# Patient Record
Sex: MALE | Race: OTHER | Employment: FULL TIME | URBAN - NONMETROPOLITAN AREA
[De-identification: names, ages, dates, MRNs, and addresses within clinical notes are randomized per-mention and may not be internally consistent; named-entity substitution may affect disease eponyms.]

---

## 2017-10-10 ENCOUNTER — HOSPITAL ENCOUNTER (EMERGENCY)
Age: 37
Discharge: HOME | End: 2017-10-10
Payer: COMMERCIAL

## 2017-10-10 VITALS
WEIGHT: 217 LBS | TEMPERATURE: 98.1 F | SYSTOLIC BLOOD PRESSURE: 129 MMHG | RESPIRATION RATE: 16 BRPM | HEART RATE: 84 BPM | DIASTOLIC BLOOD PRESSURE: 91 MMHG | OXYGEN SATURATION: 97 %

## 2017-10-10 DIAGNOSIS — R19.7 DIARRHEA, UNSPECIFIED TYPE: Primary | ICD-10-CM

## 2017-10-10 PROCEDURE — 99202 OFFICE O/P NEW SF 15 MIN: CPT | Performed by: NURSE PRACTITIONER

## 2017-10-10 PROCEDURE — 99203 OFFICE O/P NEW LOW 30 MIN: CPT

## 2017-10-10 ASSESSMENT — PAIN DESCRIPTION - ORIENTATION: ORIENTATION: MID

## 2017-10-10 ASSESSMENT — ENCOUNTER SYMPTOMS
ABDOMINAL PAIN: 1
CHEST TIGHTNESS: 0
NAUSEA: 0
SHORTNESS OF BREATH: 0
VOMITING: 0
COUGH: 0
BLOOD IN STOOL: 0
DIARRHEA: 1

## 2017-10-10 ASSESSMENT — PAIN SCALES - GENERAL: PAINLEVEL_OUTOF10: 2

## 2017-10-10 ASSESSMENT — PAIN DESCRIPTION - DESCRIPTORS: DESCRIPTORS: ACHING;PRESSURE

## 2017-10-10 ASSESSMENT — PAIN DESCRIPTION - PAIN TYPE: TYPE: ACUTE PAIN

## 2017-10-10 ASSESSMENT — PAIN DESCRIPTION - LOCATION: LOCATION: ABDOMEN

## 2017-10-10 NOTE — ED NOTES
Pt verbalized discharge instructions. Pt informed to go to ER if develop chest pain, shortness of breath or abdominal pain. Pt ambulatory out in stable condition. Assessment unchanged.        Kelly Gauthier RN  10/10/17 9986

## 2017-10-10 NOTE — ED PROVIDER NOTES
He reports that he does not use drugs. PHYSICAL EXAM     ED TRIAGE VITALS  BP: (!) 129/91, Temp: 98.1 °F (36.7 °C), Pulse: 84, Resp: 16, SpO2: 97 %  Physical Exam   Constitutional: He is oriented to person, place, and time. Vital signs are normal. He appears well-developed and well-nourished. He is cooperative. Non-toxic appearance. He does not have a sickly appearance. He does not appear ill. No distress. HENT:   Head: Normocephalic and atraumatic. Right Ear: External ear normal.   Left Ear: External ear normal.   Nose: Nose normal.   Mouth/Throat: Mucous membranes are normal.   Eyes: Conjunctivae are normal.   Neck: Normal range of motion and full passive range of motion without pain. Cardiovascular: Normal rate. Pulmonary/Chest: Effort normal. No accessory muscle usage. No respiratory distress. Abdominal: Normal appearance and bowel sounds are normal. There is no tenderness. There is no rigidity, no rebound, no guarding, no tenderness at McBurney's point and negative Fitzgerald's sign. Neurological: He is alert and oriented to person, place, and time. Skin: Skin is warm and dry. No rash (on exposed surfaces) noted. He is not diaphoretic. Psychiatric: He has a normal mood and affect. His speech is normal.   Nursing note and vitals reviewed. DIAGNOSTIC RESULTS   Labs:No results found for this visit on 10/10/17. IMAGING:  No orders to display     URGENT CARE COURSE:     Vitals:    10/10/17 1748   BP: (!) 129/91   Pulse: 84   Resp: 16   Temp: 98.1 °F (36.7 °C)   TempSrc: Oral   SpO2: 97%   Weight: 217 lb (98.4 kg)       Medications - No data to display  PROCEDURES:  None  FINAL IMPRESSION      1. Diarrhea, unspecified type        DISPOSITION/PLAN   DISPOSITION   Discharge   GI bacterial pathogens by PCR ordered while patient was here in the urgent care setting. Patient was unable to provide sample.   Patient is leaving the [address] [employer] at midnight and traveling to his next

## 2017-10-10 NOTE — ED TRIAGE NOTES
Pt to room 2 with c/o of abdominal pain and diarrhea. He reports that he spent time in [facility] in September and arrived back to [address] on Sept 21st. He reports that his symptoms started on Sept 22 with abdominal pain and muscle aches and weakness. He then started with diarrhea and was seen at and Urgent care in [facility] on Sept 25th. He was told to use the BRAT diet and the diarrhea seemed to subside. He states it lasted approx 5 days. He also reports that it has started up again in the last day and has had 15 episodes in the last 24 hours. He travels with a singing group and states that some of the other people in his group that traveled to [facility] now report that they were diagnosed with Salmonella and E coli. He reports that he gave a stool sample in [facility] but that they only tested for \"infection\" and everything was negative. He wasn't sure what what the specific tests were.